# Patient Record
(demographics unavailable — no encounter records)

---

## 2024-10-11 NOTE — CONSULT LETTER
[Dear  ___] : Dear  [unfilled], [Consult Letter:] : I had the pleasure of evaluating your patient, [unfilled]. [Sincerely,] : Sincerely, [DrChuy  ___] : Dr. BAUTISTA

## 2024-10-15 NOTE — PHYSICAL EXAM
[EOMI] : extra ocular movement intact [Sclera nonicteric] : sclera nonicteric [Supple] : supple [No Supraclavicular Adenopathy] : no supraclavicular adenopathy [No Cervical Adenopathy] : no cervical adenopathy [No Thyromegaly] : no thyromegaly [Clear to Auscultation Bilat] : clear to auscultation bilaterally [Examined in the supine and seated position] : examined in the supine and seated position [Asymmetrical] : asymmetrical [No dominant masses] : no dominant masses in right breast  [No dominant masses] : no dominant masses left breast [No Nipple Retraction] : no left nipple retraction [No Nipple Discharge] : no left nipple discharge [No Axillary Lymphadenopathy] : no left axillary lymphadenopathy [Soft] : abdomen soft [Not Tender] : non-tender [de-identified] : Faint radial scar 4:00.   [de-identified] : Small scar.

## 2024-10-15 NOTE — HISTORY OF PRESENT ILLNESS
[facility] sent Rx request for the following:      Requested Prescriptions   Pending Prescriptions Disp Refills     oxybutynin ER (DITROPAN-XL) 5 MG 24 hr tablet [Pharmacy Med Name: OXYBUTYNIN ER 5MG TABLETS] 60 tablet 3     Sig: TAKE 1 TABLET(5 MG) BY MOUTH TWICE DAILY       Muscarinic Antagonists (Urinary Incontinence Agents) Passed - 11/10/2021  1:19 PM          Last Prescription Date:   7/8/2021  Last Fill Qty/Refills:         60, R-3    Last Office Visit:              10/13/2021 Virtual  Future Office visit:           None  Routing refill request to provider for review/approval because:    Does not meet Rn refill criteria. Radha Blake RN on 11/10/2021 at 1:24 PM            [FreeTextEntry1] : This is a 77 year old  female who was found to have a new nodule on a routine right mammogram in 2017. An ultrasound guided biopsy showed an invasive cancer.  She underwent a Saviscout localized right breast lumpectomy with sentinel node biopsy in 2/2017.  She had a 5 mm invasive ductal cancer, SBR 3-4/9, ER >90% [place] >90% Her 2 neg, SLN neg x1.   She completed radiation, but developed a severe rash over her right breast and abdomen, the etiology of which was unclear.  She was only on Arimidex for a few days.  She required steroids and antibiotics and she had a skin biopsy of the abdominal skin with the dermatologist.  She was told the skin biopsy indicated a bug bite.  She took exemestane for a month, but had severe joint pains, so stopped it and tried letrozole. She stopped that also due to joint pains. She was on tamoxifen and completed 5 years of therapy.   She has no current complaints except for the asymmetry.

## 2024-10-15 NOTE — PAST MEDICAL HISTORY
[Menarche Age ____] : age at menarche was [unfilled] [Menopause Age____] : age at menopause was [unfilled] [Total Preg ___] : G[unfilled] [Live Births ___] : P[unfilled]  [Age At Live Birth ___] : Age at live birth: [unfilled] [FreeTextEntry7] : x few months [FreeTextEntry8] : no

## 2024-10-15 NOTE — PHYSICAL EXAM
[EOMI] : extra ocular movement intact [Sclera nonicteric] : sclera nonicteric [Supple] : supple [No Supraclavicular Adenopathy] : no supraclavicular adenopathy [No Cervical Adenopathy] : no cervical adenopathy [No Thyromegaly] : no thyromegaly [Clear to Auscultation Bilat] : clear to auscultation bilaterally [Examined in the supine and seated position] : examined in the supine and seated position [Asymmetrical] : asymmetrical [No dominant masses] : no dominant masses in right breast  [No dominant masses] : no dominant masses left breast [No Nipple Retraction] : no left nipple retraction [No Nipple Discharge] : no left nipple discharge [No Axillary Lymphadenopathy] : no left axillary lymphadenopathy [Soft] : abdomen soft [Not Tender] : non-tender [de-identified] : Faint radial scar 4:00.   [de-identified] : Small scar.

## 2024-12-30 NOTE — ASSESSMENT
[FreeTextEntry1] : copd exacerbation,  doxycycline and prednsione bp acceptable continue losartan hld continue simvastatin

## 2024-12-30 NOTE — PHYSICAL EXAM
[No Acute Distress] : no acute distress [PERRL] : pupils equal round and reactive to light [Normal TMs] : both tympanic membranes were normal [Supple] : supple [Regular Rhythm] : with a regular rhythm [No Edema] : there was no peripheral edema [Normal] : soft, non-tender, non-distended, no masses palpated, no HSM and normal bowel sounds [Normal Supraclavicular Nodes] : no supraclavicular lymphadenopathy [Normal Posterior Cervical Nodes] : no posterior cervical lymphadenopathy [Normal Anterior Cervical Nodes] : no anterior cervical lymphadenopathy [No Joint Swelling] : no joint swelling [No Rash] : no rash [No Focal Deficits] : no focal deficits [Normal Insight/Judgement] : insight and judgment were intact [de-identified] : mild wheezing

## 2024-12-30 NOTE — HISTORY OF PRESENT ILLNESS
[FreeTextEntry1] : cough  [de-identified] : cough congestion no temp for 1 wk productive cough and wheezing

## 2025-02-04 NOTE — ASSESSMENT
[FreeTextEntry1] : copd anbd obesity will start zepbound bp acceptable continue losartan hld continue simvastatin

## 2025-02-04 NOTE — HEALTH RISK ASSESSMENT
[1] : 2) Feeling down, depressed, or hopeless for several days (1) [Nearly Every Day (3)] : 4.) Feeling tired or having little energy? Nearly every day [Several Days (1)] : 7.) Trouble concentrating on things, such as reading a newspaper or watching television? Several days [Not at All (0)] : 9.) Thoughts that you would be off dead or of hurting yourself in some way? Not at all [Moderate] : Severity of Depression is Moderate [[id number]] : 9

## 2025-02-06 NOTE — HISTORY OF PRESENT ILLNESS
[FreeTextEntry1] : Pt is a 76 y/o F PMH HTN, HLD, preDM, breast cancer 2017 s/p lumpectomy and RXT, hypothyroidism, COPD She mentions baseline MCGRATH due to her COPD which has not worsened.  She denies CP, diaphoresis, palpitations, dizziness, syncope, LE edema, PND, orthopnea.   TTE 05/2023 EF 56%, trace AI/MR/TR chapa 05/2023 NSR brief runs SVT longest 17 beats   Family hx: father MI 79 Smoking status: quit 2007 social ETOH no drug use Current exercise: walking 2000 steps Daily water intake: 32 oz Daily caffeine intake: 3 cups coffee Previous hospitalizations: oophorectomy, lumpectomy - no problems with anesthesia 3 children - no problem with pregnancies

## 2025-02-06 NOTE — DISCUSSION/SUMMARY
Patient called and confirmed appointments for tomorrow  [EKG obtained to assist in diagnosis and management of assessed problem(s)] : EKG obtained to assist in diagnosis and management of assessed problem(s) [FreeTextEntry1] : Pt is a 76 y/o F PMH HTN, HLD, preDM, breast cancer 2017 s/p lumpectomy and RXT, hypothyroidism, COPD  HTN: well controlled c/w losartan/HCTZ 100/25mg qd c/w norvasc 5mg qd Discussed the long-term health risks of uncontrolled BP.  Advised low salt diet, regular exercise, maintaining ideal weight. Encouraged pt to check BP at home and keep journal   HLD: c/w statin Advised lifestyle modifications   preDM: Advise lifestyle modifications   Most recent available lab results were reviewed with pt. Pt will return in 12 mnths or sooner as needed but I encouraged communication via phone or portal if necessary.  We will call pt with test results when applicable and arrange for expedited follow up if warranted.  The described plan was discussed with the pt.  All questions and concerns were addressed to the best of my knowledge.

## 2025-03-06 NOTE — PLAN
[FreeTextEntry1] : Rash - unclear etiology - completed steroids - trial of hydroxyzine and cream - allergy and derm referrals

## 2025-03-06 NOTE — PHYSICAL EXAM
[No Acute Distress] : no acute distress [Well-Appearing] : well-appearing [de-identified] : diffuse erythematous macular rash on abdomen up to breast ad arms

## 2025-03-06 NOTE — HISTORY OF PRESENT ILLNESS
[FreeTextEntry8] : 77 year old female presents with rash for past 1-2 weeks. Initially started on her face. Then legs. Now on abdomen. Is itchy and she does not feel well with it. She denies new products and food. She does admit to second zepbound injection prior to rash. She does admit to having the rash in the past and had a difficult time finding answers

## 2025-03-11 NOTE — HISTORY OF PRESENT ILLNESS
[FreeTextEntry1] : This is a 77 year old  female who was found to have a new nodule on a routine right mammogram in 2017. An ultrasound guided biopsy showed an invasive cancer.  She underwent a Saviscout localized right breast lumpectomy with sentinel node biopsy in 2/2017.  She had a 5 mm invasive ductal cancer, SBR 3-4/9, ER >90% [place] >90% Her 2 neg, SLN neg x1.   She completed radiation, but developed a severe rash over her right breast and abdomen, the etiology of which was unclear.  She was only on Arimidex for a few days.  She required steroids and antibiotics and she had a skin biopsy of the abdominal skin with the dermatologist.  She was told the skin biopsy indicated a bug bite.  She took exemestane for a month, but had severe joint pains, so stopped it and tried letrozole. She stopped that also due to joint pains. She was on tamoxifen and completed 5 years of therapy.   She has no current complaints except for the asymmetry.  She is here today with a c/o right breast redness and swelling x   a few days.  She notes a rash on her body that began approximately three weeks ago.  The rash is red, itchy and is all over her body.  It developed shortly after taking a second dose of Zepbound injection.  The rash was initially on her face then legs and trunk it is around both breasts and is worse on the right side.  She has had similar type of rash before; this is the third time.  Last time she had this was after undergoing radiation therapy to her right breast.  She required antibiotics when breast became swollen and red.  She is currently on Prednisone for the rash as well as triamcinolone ointment.  She denies fevers, chills..

## 2025-03-11 NOTE — PHYSICAL EXAM
[Sclera nonicteric] : sclera nonicteric [No Supraclavicular Adenopathy] : no supraclavicular adenopathy [No Cervical Adenopathy] : no cervical adenopathy [Clear to Auscultation Bilat] : clear to auscultation bilaterally [Asymmetrical] : asymmetrical [No dominant masses] : no dominant masses in right breast  [No Nipple Retraction] : no right nipple retraction [No Nipple Discharge] : no right nipple discharge [Normocephalic] : normocephalic [Atraumatic] : atraumatic [de-identified] : breast swollen, diffuse erythema more pronounced in the lateral and inferior aspect, radial scar 4:00.   [de-identified] : erythematous macular rash on trunk and inframammary fold bilaterally

## 2025-03-18 NOTE — PHYSICAL EXAM
[Asymmetrical] : asymmetrical [No dominant masses] : no dominant masses in right breast  [No Nipple Retraction] : no right nipple retraction [No Nipple Discharge] : no right nipple discharge

## 2025-04-02 NOTE — DATA REVIEWED
[FreeTextEntry1] : MAMMO SCREEN W [facility]ER[facility] BI 04/02/2025:IMPRESSION: No mammographic evidence of malignancy.BI-RADS 2 - Benign Finding(s)

## 2025-04-02 NOTE — PHYSICAL EXAM
[Asymmetrical] : asymmetrical [No dominant masses] : no dominant masses in right breast  [No Nipple Retraction] : no right nipple retraction [No Nipple Discharge] : no right nipple discharge [de-identified] : breast without erythema/swelling

## 2025-04-02 NOTE — HISTORY OF PRESENT ILLNESS
[FreeTextEntry1] : This is a 77 year old  female who was found to have a new nodule on a routine right mammogram in 2017. An ultrasound guided biopsy showed an invasive cancer.  She underwent a Saviscout localized right breast lumpectomy with sentinel node biopsy in 2/2017.  She had a 5 mm invasive ductal cancer, SBR 3-4/9, ER >90% [place] >90% Her 2 neg, SLN neg x1.   She completed radiation, but developed a severe rash over her right breast and abdomen, the etiology of which was unclear.  She was only on Arimidex for a few days.  She required steroids and antibiotics and she had a skin biopsy of the abdominal skin with the dermatologist.  She was told the skin biopsy indicated a bug bite.  She took exemestane for a month, but had severe joint pains, so stopped it and tried letrozole. She stopped that also due to joint pains. She was on tamoxifen and completed 5 years of therapy.   She has no current complaints except for the asymmetry.  She is here today in follow up of  a c/o right breast redness and swelling x   a few days.  She notes a rash on her body that began approximately three weeks ago.  The rash is red, itchy and is all over her body.  It developed shortly after taking a second dose of Zepbound injection.  The rash was initially on her face then legs and trunk it is around both breasts and is worse on the right side.  She has had similar type of rash before; this is the third time.  Last time she had this was after undergoing radiation therapy to her right breast.  She required antibiotics when breast became swollen and red.  She is currently on Prednisone for the rash as well as triamcinolone ointment.  She denies fevers, chills..   She reports resolution of her breast symptoms.

## 2025-05-29 NOTE — ASSESSMENT
[FreeTextEntry1] : copd  start dupixant may also help rash lab evaluation bp acceptable continue amlodipine hypothyroid continue levothyroxine

## 2025-05-29 NOTE — PHYSICAL EXAM
[No Acute Distress] : no acute distress [PERRL] : pupils equal round and reactive to light [Normal TMs] : both tympanic membranes were normal [Supple] : supple [Clear to Auscultation] : lungs were clear to auscultation bilaterally [Regular Rhythm] : with a regular rhythm [No Edema] : there was no peripheral edema [Normal] : soft, non-tender, non-distended, no masses palpated, no HSM and normal bowel sounds [Normal Supraclavicular Nodes] : no supraclavicular lymphadenopathy [Normal Posterior Cervical Nodes] : no posterior cervical lymphadenopathy [Normal Anterior Cervical Nodes] : no anterior cervical lymphadenopathy [No Joint Swelling] : no joint swelling [No Focal Deficits] : no focal deficits [Normal Insight/Judgement] : insight and judgment were intact [de-identified] : scaling skin

## 2025-05-29 NOTE — HISTORY OF PRESENT ILLNESS
[FreeTextEntry1] : pt c/o fluctuating rash for 4 yrs  rash comes and goes all over body  [de-identified] : rash itches breathing ok

## 2025-06-04 NOTE — PHYSICAL EXAM
[No Acute Distress] : no acute distress [PERRL] : pupils equal round and reactive to light [Normal TMs] : both tympanic membranes were normal [Supple] : supple [Clear to Auscultation] : lungs were clear to auscultation bilaterally [Regular Rhythm] : with a regular rhythm [No Edema] : there was no peripheral edema [Normal] : soft, non-tender, non-distended, no masses palpated, no HSM and normal bowel sounds [Normal Supraclavicular Nodes] : no supraclavicular lymphadenopathy [Normal Posterior Cervical Nodes] : no posterior cervical lymphadenopathy [Normal Anterior Cervical Nodes] : no anterior cervical lymphadenopathy [No Joint Swelling] : no joint swelling [No Focal Deficits] : no focal deficits [Normal Insight/Judgement] : insight and judgment were intact [de-identified] : scaling skin

## 2025-06-04 NOTE — ASSESSMENT
[FreeTextEntry1] : ua negative urine c+s ct urogram copd approved for dupixant bp acceptable continue losartan hld continue simvastatin

## 2025-06-25 NOTE — HISTORY OF PRESENT ILLNESS
[Spouse] : spouse [FreeTextEntry1] : PT HERE FOR MANAGEMENT OF COPD ASTHMA HTN HYPOTHYROID  [de-identified] : PT HERE FOR INSTRUCTIONS ON INJECTIING GOSIAIXANT

## 2025-06-25 NOTE — ASSESSMENT
[FreeTextEntry1] : COPD GIVEN DUPIXANT IN OFFICE CONTINUE TRELEGY  HTN CONTINUE LOSARTAN  HYPOTHYROID CONTINUE LEVOTHYROXINE

## 2025-06-25 NOTE — PHYSICAL EXAM
[No Acute Distress] : no acute distress [PERRL] : pupils equal round and reactive to light [Normal TMs] : both tympanic membranes were normal [Supple] : supple [Clear to Auscultation] : lungs were clear to auscultation bilaterally [Regular Rhythm] : with a regular rhythm [No Edema] : there was no peripheral edema [Normal] : soft, non-tender, non-distended, no masses palpated, no HSM and normal bowel sounds [Normal Supraclavicular Nodes] : no supraclavicular lymphadenopathy [Normal Posterior Cervical Nodes] : no posterior cervical lymphadenopathy [Normal Anterior Cervical Nodes] : no anterior cervical lymphadenopathy [No Joint Swelling] : no joint swelling [No Focal Deficits] : no focal deficits [Normal Insight/Judgement] : insight and judgment were intact [de-identified] : scaling skin